# Patient Record
Sex: MALE | Race: ASIAN | NOT HISPANIC OR LATINO
[De-identification: names, ages, dates, MRNs, and addresses within clinical notes are randomized per-mention and may not be internally consistent; named-entity substitution may affect disease eponyms.]

---

## 2019-05-29 PROBLEM — Z00.00 ENCOUNTER FOR PREVENTIVE HEALTH EXAMINATION: Status: ACTIVE | Noted: 2019-05-29

## 2019-06-25 ENCOUNTER — APPOINTMENT (OUTPATIENT)
Dept: HEART AND VASCULAR | Facility: CLINIC | Age: 44
End: 2019-06-25
Payer: COMMERCIAL

## 2019-06-25 VITALS
SYSTOLIC BLOOD PRESSURE: 100 MMHG | DIASTOLIC BLOOD PRESSURE: 70 MMHG | TEMPERATURE: 98 F | OXYGEN SATURATION: 97 % | HEIGHT: 68 IN | WEIGHT: 184.99 LBS | BODY MASS INDEX: 28.04 KG/M2 | HEART RATE: 73 BPM

## 2019-06-25 DIAGNOSIS — Z78.9 OTHER SPECIFIED HEALTH STATUS: ICD-10-CM

## 2019-06-25 DIAGNOSIS — Z87.11 PERSONAL HISTORY OF PEPTIC ULCER DISEASE: ICD-10-CM

## 2019-06-25 DIAGNOSIS — Z82.49 FAMILY HISTORY OF ISCHEMIC HEART DISEASE AND OTHER DISEASES OF THE CIRCULATORY SYSTEM: ICD-10-CM

## 2019-06-25 DIAGNOSIS — R68.3 CLUBBING OF FINGERS: ICD-10-CM

## 2019-06-25 PROCEDURE — 93000 ELECTROCARDIOGRAM COMPLETE: CPT

## 2019-06-25 PROCEDURE — 99203 OFFICE O/P NEW LOW 30 MIN: CPT

## 2019-06-25 NOTE — ASSESSMENT
[FreeTextEntry1] : Chest Pain- also reported SALINAS.  Pt says he was not believed when he reported abdominal pain and had an ulcer which presented with a perforation.  Stress test ordered.  Told to take Nexium x 30 days in case the CP is GI.  Pt to get me copy of recent labs.\par \par Clubbing-  will echo and get CXR\par \par Poor Sleep- referred to sleep specialist

## 2019-06-25 NOTE — PHYSICAL EXAM
[General Appearance - Well Developed] : well developed [Normal Appearance] : normal appearance [Well Groomed] : well groomed [General Appearance - Well Nourished] : well nourished [No Deformities] : no deformities [Normal Conjunctiva] : the conjunctiva exhibited no abnormalities [General Appearance - In No Acute Distress] : no acute distress [Eyelids - No Xanthelasma] : the eyelids demonstrated no xanthelasmas [Normal Oral Mucosa] : normal oral mucosa [No Oral Pallor] : no oral pallor [No Oral Cyanosis] : no oral cyanosis [Heart Rate And Rhythm] : heart rate and rhythm were normal [Heart Sounds] : normal S1 and S2 [Murmurs] : no murmurs present [Respiration, Rhythm And Depth] : normal respiratory rhythm and effort [Exaggerated Use Of Accessory Muscles For Inspiration] : no accessory muscle use [Auscultation Breath Sounds / Voice Sounds] : lungs were clear to auscultation bilaterally [Abdomen Soft] : soft [Abdomen Tenderness] : non-tender [Abdomen Mass (___ Cm)] : no abdominal mass palpated [Abnormal Walk] : normal gait [Gait - Sufficient For Exercise Testing] : the gait was sufficient for exercise testing [Nail Clubbing] : no clubbing of the fingernails [Cyanosis, Localized] : no localized cyanosis [Petechial Hemorrhages (___cm)] : no petechial hemorrhages [Skin Color & Pigmentation] : normal skin color and pigmentation [] : no rash [No Venous Stasis] : no venous stasis [Skin Lesions] : no skin lesions [No Skin Ulcers] : no skin ulcer [No Xanthoma] : no  xanthoma was observed [Oriented To Time, Place, And Person] : oriented to person, place, and time [Affect] : the affect was normal [Mood] : the mood was normal [No Anxiety] : not feeling anxious [FreeTextEntry1] : + clubbing

## 2019-06-25 NOTE — HISTORY OF PRESENT ILLNESS
[FreeTextEntry1] : PCP- Je Correia\par \par \par \par Allergic to Shellfish\par Meds- Dermatologicals

## 2019-06-25 NOTE — REASON FOR VISIT
[FreeTextEntry1] : 44 y/o M with CP\par Onset:  1 mos\par Location: left chest\par Duration:  minutes to hrs\par Frequency: can vary, almost every day\par Character: discomfort , no radiation\par Associated Symptoms: SALINAS\par Severity: mild\par Modifying Factors:  started going to the gym\par

## 2019-07-24 ENCOUNTER — APPOINTMENT (OUTPATIENT)
Dept: HEART AND VASCULAR | Facility: CLINIC | Age: 44
End: 2019-07-24
Payer: COMMERCIAL

## 2019-07-24 DIAGNOSIS — R07.2 PRECORDIAL PAIN: ICD-10-CM

## 2019-07-24 PROCEDURE — ZZZZZ: CPT

## 2019-07-24 PROCEDURE — 93015 CV STRESS TEST SUPVJ I&R: CPT

## 2019-07-24 PROCEDURE — 93306 TTE W/DOPPLER COMPLETE: CPT

## 2019-08-23 ENCOUNTER — OTHER (OUTPATIENT)
Age: 44
End: 2019-08-23

## 2019-08-23 ENCOUNTER — APPOINTMENT (OUTPATIENT)
Dept: PULMONOLOGY | Facility: CLINIC | Age: 44
End: 2019-08-23
Payer: COMMERCIAL

## 2019-08-23 VITALS
DIASTOLIC BLOOD PRESSURE: 84 MMHG | SYSTOLIC BLOOD PRESSURE: 128 MMHG | WEIGHT: 184 LBS | OXYGEN SATURATION: 97 % | HEART RATE: 73 BPM | BODY MASS INDEX: 27.89 KG/M2 | TEMPERATURE: 98.4 F | HEIGHT: 68 IN

## 2019-08-23 DIAGNOSIS — Z86.69 PERSONAL HISTORY OF OTHER DISEASES OF THE NERVOUS SYSTEM AND SENSE ORGANS: ICD-10-CM

## 2019-08-23 DIAGNOSIS — G47.19 OTHER HYPERSOMNIA: ICD-10-CM

## 2019-08-23 DIAGNOSIS — Z72.820 SLEEP DEPRIVATION: ICD-10-CM

## 2019-08-23 DIAGNOSIS — R06.83 SNORING: ICD-10-CM

## 2019-08-23 PROCEDURE — 99204 OFFICE O/P NEW MOD 45 MIN: CPT

## 2019-08-23 NOTE — HISTORY OF PRESENT ILLNESS
[FreeTextEntry1] : 08/23/2019 :  TOAN FERREIRA is a 44 year male who is here for evaluation of poor sleep.  Sleeps only 4-6 h per night, bedtime 1 am, latency 30 min, 4-5 wakes, up at 630 am.  No naps. Sleep limited by schedule.  Told of severe snoring, wakes self choking.  Severe excessive daytime somnolence with [place] sleepiness scale (out of 24 points) = 21.  Denies morning headache,  restless legs, leg movements, cataplexy or sleep paralysis.  Occasional sleep talking.  Moves a lot during night's sleep. \par \par Gained about 25 lbs past 5 yrs

## 2019-08-23 NOTE — ASSESSMENT
[FreeTextEntry1] : severe excessive daytime somnolence- some is due to short sleep but has clear risks for obstructive sleep apnea with severe snoring and probable apnea (choking).  \par \par Based on history and physical exam, sleep disordered breathing is at least moderately likely.  The patient was advised to have overnight polysomnography, and will be seen in follow up after testing. \par \par The patient is advised that in addition to worsening sleep leading to daytime sleepiness, sleep apnea may be associated with worsening hypertension and may be a risk factor for cardiovascular disease and stroke.

## 2019-08-23 NOTE — PHYSICAL EXAM
[General Appearance - Well Developed] : well developed [Well Groomed] : well groomed [Normal Appearance] : normal appearance [General Appearance - Well Nourished] : well nourished [No Deformities] : no deformities [General Appearance - In No Acute Distress] : no acute distress [Eyelids - No Xanthelasma] : the eyelids demonstrated no xanthelasmas [Normal Conjunctiva] : the conjunctiva exhibited no abnormalities [IV] : IV [Low Lying Soft Palate] : low lying soft palate [Neck Cervical Mass (___cm)] : no neck mass was observed [Neck Appearance] : the appearance of the neck was normal [Thyroid Nodule] : there were no palpable thyroid nodules [Thyroid Diffuse Enlargement] : the thyroid was not enlarged [Jugular Venous Distention Increased] : there was no jugular-venous distention [Heart Rate And Rhythm] : heart rate was normal and rhythm regular [Heart Sounds] : normal S1 and S2 [Murmurs] : no murmurs [Heart Sounds Gallop] : no gallops [Heart Sounds Pericardial Friction Rub] : no pericardial rub [Auscultation Breath Sounds / Voice Sounds] : lungs were clear to auscultation bilaterally [Abnormal Walk] : normal gait [Musculoskeletal - Swelling] : no joint swelling seen [Motor Tone] : muscle strength and tone were normal [Nail Clubbing] : no clubbing of the fingernails [Cyanosis, Localized] : no localized cyanosis [Petechial Hemorrhages (___cm)] : no petechial hemorrhages [Skin Color & Pigmentation] : normal skin color and pigmentation [] : no rash [Skin Turgor] : normal skin turgor [No Focal Deficits] : no focal deficits [Sensation] : the sensory exam was normal to light touch and pinprick [Deep Tendon Reflexes (DTR)] : deep tendon reflexes were 2+ and symmetric [Impaired Insight] : insight and judgment were intact [Oriented To Time, Place, And Person] : oriented to person, place, and time [Affect] : the affect was normal

## 2019-09-05 ENCOUNTER — OTHER (OUTPATIENT)
Age: 44
End: 2019-09-05

## 2019-09-30 ENCOUNTER — OTHER (OUTPATIENT)
Age: 44
End: 2019-09-30

## 2020-01-03 ENCOUNTER — OTHER (OUTPATIENT)
Age: 45
End: 2020-01-03

## 2020-05-13 ENCOUNTER — TRANSCRIPTION ENCOUNTER (OUTPATIENT)
Age: 45
End: 2020-05-13

## 2020-11-12 ENCOUNTER — TRANSCRIPTION ENCOUNTER (OUTPATIENT)
Age: 45
End: 2020-11-12

## 2020-12-14 ENCOUNTER — FORM ENCOUNTER (OUTPATIENT)
Age: 45
End: 2020-12-14

## 2020-12-23 ENCOUNTER — OUTPATIENT (OUTPATIENT)
Dept: OUTPATIENT SERVICES | Facility: HOSPITAL | Age: 45
LOS: 1 days | End: 2020-12-23
Payer: COMMERCIAL

## 2020-12-23 ENCOUNTER — APPOINTMENT (OUTPATIENT)
Dept: SLEEP CENTER | Facility: HOME HEALTH | Age: 45
End: 2020-12-23
Payer: COMMERCIAL

## 2020-12-23 PROCEDURE — 95800 SLP STDY UNATTENDED: CPT | Mod: 26

## 2020-12-23 PROCEDURE — 95800 SLP STDY UNATTENDED: CPT

## 2020-12-24 DIAGNOSIS — G47.33 OBSTRUCTIVE SLEEP APNEA (ADULT) (PEDIATRIC): ICD-10-CM

## 2025-04-26 ENCOUNTER — NON-APPOINTMENT (OUTPATIENT)
Age: 50
End: 2025-04-26